# Patient Record
Sex: FEMALE | URBAN - METROPOLITAN AREA
[De-identification: names, ages, dates, MRNs, and addresses within clinical notes are randomized per-mention and may not be internally consistent; named-entity substitution may affect disease eponyms.]

---

## 2024-03-29 ENCOUNTER — OV NP (OUTPATIENT)
Dept: URBAN - METROPOLITAN AREA CLINIC 72 | Facility: CLINIC | Age: 34
End: 2024-03-29
Payer: COMMERCIAL

## 2024-03-29 ENCOUNTER — LAB (OUTPATIENT)
Dept: URBAN - METROPOLITAN AREA CLINIC 72 | Facility: CLINIC | Age: 34
End: 2024-03-29

## 2024-03-29 VITALS
BODY MASS INDEX: 35.74 KG/M2 | WEIGHT: 194.2 LBS | TEMPERATURE: 97.3 F | DIASTOLIC BLOOD PRESSURE: 88 MMHG | HEART RATE: 69 BPM | HEIGHT: 62 IN | SYSTOLIC BLOOD PRESSURE: 137 MMHG

## 2024-03-29 DIAGNOSIS — K21.00 GASTROESOPHAGEAL REFLUX DISEASE WITH ESOPHAGITIS WITHOUT HEMORRHAGE: ICD-10-CM

## 2024-03-29 DIAGNOSIS — R11.0 NAUSEA: ICD-10-CM

## 2024-03-29 DIAGNOSIS — R19.4 ALTERED BOWEL HABITS: ICD-10-CM

## 2024-03-29 DIAGNOSIS — R10.13 EPIGASTRIC PAIN: ICD-10-CM

## 2024-03-29 DIAGNOSIS — Z83.719 FAMILY HISTORY OF COLONIC POLYPS: ICD-10-CM

## 2024-03-29 DIAGNOSIS — R10.84 GENERALIZED ABDOMINAL PAIN: ICD-10-CM

## 2024-03-29 DIAGNOSIS — R74.8 ELEVATED LIVER ENZYMES: ICD-10-CM

## 2024-03-29 DIAGNOSIS — K76.0 NON-ALCOHOLIC FATTY LIVER DISEASE: ICD-10-CM

## 2024-03-29 PROBLEM — 266433003: Status: ACTIVE | Noted: 2024-03-29

## 2024-03-29 PROBLEM — 1231824009: Status: ACTIVE | Noted: 2024-03-29

## 2024-03-29 PROCEDURE — 99204 OFFICE O/P NEW MOD 45 MIN: CPT | Performed by: NURSE PRACTITIONER

## 2024-03-29 RX ORDER — SOD SULF/POT CHLORIDE/MAG SULF 1.479 G
AS DIRECTED TABLET ORAL
Qty: 1 KIT | Refills: 0 | OUTPATIENT
Start: 2024-03-29

## 2024-03-29 RX ORDER — NORETHINDRONE ACETATE AND ETHINYL ESTRADIOL, AND FERROUS FUMARATE 1MG-20(24)
1 TABLET KIT ORAL ONCE A DAY
Status: ACTIVE | COMMUNITY

## 2024-03-29 RX ORDER — ESOMEPRAZOLE MAGNESIUM 20 MG/1
1 CAPSULE CAPSULE, DELAYED RELEASE ORAL ONCE A DAY
Status: ACTIVE | COMMUNITY

## 2024-03-29 RX ORDER — LEVOTHYROXINE SODIUM 112 UG/1
1 CAPSULE IN THE MORNING ON AN EMPTY STOMACH CAPSULE ORAL ONCE A DAY
Status: ACTIVE | COMMUNITY

## 2024-03-29 RX ORDER — LOSARTAN POTASSIUM 25 MG/1
1 TABLET TABLET, FILM COATED ORAL ONCE A DAY
Status: ACTIVE | COMMUNITY

## 2024-03-29 RX ORDER — EMPAGLIFLOZIN 10 MG/1
1 TABLET TABLET, FILM COATED ORAL ONCE A DAY
Status: ACTIVE | COMMUNITY

## 2024-03-29 RX ORDER — ESCITALOPRAM OXALATE 10 MG/1
1 TABLET TABLET ORAL ONCE A DAY
Status: ACTIVE | COMMUNITY

## 2024-03-29 NOTE — PHYSICAL EXAM GASTROINTESTINAL
soft, nondistended, generalized tenderness upper abdomen greater than lower abdomen, no guarding or rigidity, no rebound tenderness

## 2024-03-29 NOTE — HPI-OTHER HISTORIES
Labs: -4/27/2023. LFTs normal. -11/28/2023. LFTs: ALT 97, AST 64. -1/19/2024. CMP: AST 88, ALT 96. TSH, hemoglobin A1c normal. -3/8/2024. GGT 66. CMP: Glucose 68, AST 49, ALT 59.  Imaging: -Gallbladder ultrasound 3/27/2024 revealed hepatic steatosis. Normal pancreas and gallbladder. CBD not visualized secondary to overlying bowel gas.

## 2024-03-29 NOTE — HPI-TODAY'S VISIT:
33-year-old female new to the clinic referred by Dr. Zuleta for elevated LFTs.   A copy of this note will be sent to the referring provider   She follows endocrinology Dr. Caldwell for Hashimoto's Hypothyroidism and insulin resistance.  Her older sister had colon polyps.    She takes omeprazole for GERD for the past 2 months, that has helped her symptoms.  Reflux is worse at night.  Has significant bloating for several months. Altered bowel habits. Had formed 2 BM's yesterday.  No melena or hematochezia. Has nausea, with dry heaves. Has ondansetron for nausea. No weight loss.  Rare ETOH use. Denies drugs.  No herbal supplements.  Had GI Map 2022, had C. Diff was treated with numerous supplements. No CP, SOB, palpitations, syncope, near-syncope or problems with anesthesia previously.   Was on several GL1-P agonists last year for insulin resistance but they didn't help. Has been off them for a year.    No prior EGD or Colonoscopy LMP: Denies pregnancy.

## 2024-04-26 ENCOUNTER — OV EP (OUTPATIENT)
Dept: URBAN - METROPOLITAN AREA CLINIC 72 | Facility: CLINIC | Age: 34
End: 2024-04-26
Payer: COMMERCIAL

## 2024-04-26 VITALS
HEART RATE: 80 BPM | HEIGHT: 62 IN | TEMPERATURE: 96.6 F | BODY MASS INDEX: 35.41 KG/M2 | SYSTOLIC BLOOD PRESSURE: 128 MMHG | DIASTOLIC BLOOD PRESSURE: 83 MMHG | WEIGHT: 192.4 LBS

## 2024-04-26 DIAGNOSIS — K21.00 GASTROESOPHAGEAL REFLUX DISEASE WITH ESOPHAGITIS WITHOUT HEMORRHAGE: ICD-10-CM

## 2024-04-26 DIAGNOSIS — R10.84 GENERALIZED ABDOMINAL PAIN: ICD-10-CM

## 2024-04-26 DIAGNOSIS — R10.13 EPIGASTRIC PAIN: ICD-10-CM

## 2024-04-26 DIAGNOSIS — R19.4 ALTERED BOWEL HABITS: ICD-10-CM

## 2024-04-26 DIAGNOSIS — K75.81 STEATOHEPATITIS: ICD-10-CM

## 2024-04-26 DIAGNOSIS — R11.0 NAUSEA: ICD-10-CM

## 2024-04-26 DIAGNOSIS — Z83.719 FAMILY HISTORY OF COLONIC POLYPS: ICD-10-CM

## 2024-04-26 PROBLEM — 442191002: Status: ACTIVE | Noted: 2024-04-26

## 2024-04-26 PROCEDURE — 99214 OFFICE O/P EST MOD 30 MIN: CPT | Performed by: NURSE PRACTITIONER

## 2024-04-26 RX ORDER — ESCITALOPRAM OXALATE 10 MG/1
1 TABLET TABLET ORAL ONCE A DAY
Status: ACTIVE | COMMUNITY

## 2024-04-26 RX ORDER — LOSARTAN POTASSIUM 25 MG/1
1 TABLET TABLET, FILM COATED ORAL ONCE A DAY
Status: ACTIVE | COMMUNITY

## 2024-04-26 RX ORDER — ESOMEPRAZOLE MAGNESIUM 20 MG/1
1 CAPSULE CAPSULE, DELAYED RELEASE ORAL ONCE A DAY
Status: ACTIVE | COMMUNITY

## 2024-04-26 RX ORDER — LEVOTHYROXINE SODIUM 112 UG/1
1 CAPSULE IN THE MORNING ON AN EMPTY STOMACH CAPSULE ORAL ONCE A DAY
Status: ACTIVE | COMMUNITY

## 2024-04-26 RX ORDER — SOD SULF/POT CHLORIDE/MAG SULF 1.479 G
AS DIRECTED TABLET ORAL
Qty: 1 KIT | Refills: 0 | OUTPATIENT

## 2024-04-26 RX ORDER — NORETHINDRONE ACETATE AND ETHINYL ESTRADIOL, AND FERROUS FUMARATE 1MG-20(24)
1 TABLET KIT ORAL ONCE A DAY
Status: ACTIVE | COMMUNITY

## 2024-04-26 RX ORDER — EMPAGLIFLOZIN 10 MG/1
1 TABLET TABLET, FILM COATED ORAL ONCE A DAY
Status: ACTIVE | COMMUNITY

## 2024-04-26 RX ORDER — SOD SULF/POT CHLORIDE/MAG SULF 1.479 G
AS DIRECTED TABLET ORAL
Qty: 1 KIT | Refills: 0 | Status: ACTIVE | COMMUNITY
Start: 2024-03-29

## 2024-04-26 NOTE — HPI-TODAY'S VISIT:
33-year-old female here for a 1 month follow-up appointment.  Last seen 3/29/2024 with elevated liver enzymes, epigastric pain, GERD, altered bowel habits, nausea and nonalcoholic fatty liver disease, family history of colon polyps. Labs ordered for further evaluation. For her GERD and family history of colon polyps EGD and colonoscopy ordered as well. Procedures are scheduled for 5/3/2024.  She follows endocrinology Dr. Caldwell for Hashimoto's Hypothyroidism and insulin resistance.  Her older sister had colon polyps.    Today symptoms similar to prior.  She takes omeprazole for GERD for the past 3 months, that has helped her symptoms.  Reflux is worse at night.  Has significant bloating for several months. Altered bowel habits. Had formed 2 BM's yesterday.  No melena or hematochezia. Has nausea, with dry heaves. Has ondansetron for nausea. No weight loss.  Rare ETOH use. Denies drugs.  No herbal supplements.  Had GI Map 2022, had C. Diff was treated with numerous supplements. Weight stable from last appointment.    Was on several GL1-P agonists last year for insulin resistance but they didn't help. Has been off them for a year.    No prior EGD or Colonoscopy LMP: Denies pregnancy.

## 2024-04-26 NOTE — HPI-OTHER HISTORIES
Labs: -4/27/2023. LFTs normal. -11/28/2023. LFTs: ALT 97, AST 64. -1/19/2024. CMP: AST 88, ALT 96. TSH, hemoglobin A1c normal. -3/8/2024. GGT 66. CMP: Glucose 68, AST 49, ALT 59. -4/12/2024. Iron studies normal. Ceruloplasmin mildly elevated 46.7. GGT 86. A1A normal. Ferritin, ASMA, AMA, LKMB, MARKOS, hemochromatosis and hepatitis panel all negative. She is not immune to hepatitis B.  Imaging: -Gallbladder ultrasound 3/27/2024 revealed hepatic steatosis. Normal pancreas and gallbladder. CBD not visualized secondary to overlying bowel gas.

## 2024-05-03 ENCOUNTER — OFFICE VISIT (OUTPATIENT)
Dept: URBAN - METROPOLITAN AREA MEDICAL CENTER 40 | Facility: MEDICAL CENTER | Age: 34
End: 2024-05-03
Payer: COMMERCIAL

## 2024-05-03 DIAGNOSIS — K20.80 ESOPHAGITIS, LOS ANGELES GRADE A: ICD-10-CM

## 2024-05-03 DIAGNOSIS — Z83.719 FAMILY HISTORY OF COLON POLYPS, UNSPECIFIED: ICD-10-CM

## 2024-05-03 DIAGNOSIS — K29.80 ACUTE DUODENITIS: ICD-10-CM

## 2024-05-03 DIAGNOSIS — D12.3 ADENOMA OF TRANSVERSE COLON: ICD-10-CM

## 2024-05-03 DIAGNOSIS — R19.4 ALTERATION IN BOWEL ELIMINATION: ICD-10-CM

## 2024-05-03 PROCEDURE — 43239 EGD BIOPSY SINGLE/MULTIPLE: CPT | Performed by: INTERNAL MEDICINE

## 2024-05-03 PROCEDURE — 45385 COLONOSCOPY W/LESION REMOVAL: CPT | Performed by: INTERNAL MEDICINE

## 2024-05-03 RX ORDER — ESCITALOPRAM OXALATE 10 MG/1
1 TABLET TABLET ORAL ONCE A DAY
Status: ACTIVE | COMMUNITY

## 2024-05-03 RX ORDER — ESOMEPRAZOLE MAGNESIUM 20 MG/1
1 CAPSULE CAPSULE, DELAYED RELEASE ORAL ONCE A DAY
Status: ACTIVE | COMMUNITY

## 2024-05-03 RX ORDER — EMPAGLIFLOZIN 10 MG/1
1 TABLET TABLET, FILM COATED ORAL ONCE A DAY
Status: ACTIVE | COMMUNITY

## 2024-05-03 RX ORDER — SOD SULF/POT CHLORIDE/MAG SULF 1.479 G
AS DIRECTED TABLET ORAL
Qty: 1 KIT | Refills: 0 | Status: ACTIVE | COMMUNITY

## 2024-05-03 RX ORDER — NORETHINDRONE ACETATE AND ETHINYL ESTRADIOL, AND FERROUS FUMARATE 1MG-20(24)
1 TABLET KIT ORAL ONCE A DAY
Status: ACTIVE | COMMUNITY

## 2024-05-03 RX ORDER — LEVOTHYROXINE SODIUM 112 UG/1
1 CAPSULE IN THE MORNING ON AN EMPTY STOMACH CAPSULE ORAL ONCE A DAY
Status: ACTIVE | COMMUNITY

## 2024-05-03 RX ORDER — LOSARTAN POTASSIUM 25 MG/1
1 TABLET TABLET, FILM COATED ORAL ONCE A DAY
Status: ACTIVE | COMMUNITY

## 2024-05-17 ENCOUNTER — OFFICE VISIT (OUTPATIENT)
Dept: URBAN - METROPOLITAN AREA CLINIC 72 | Facility: CLINIC | Age: 34
End: 2024-05-17
Payer: COMMERCIAL

## 2024-05-17 ENCOUNTER — CLAIMS CREATED FROM THE CLAIM WINDOW (OUTPATIENT)
Dept: URBAN - METROPOLITAN AREA CLINIC 72 | Facility: CLINIC | Age: 34
End: 2024-05-17

## 2024-05-17 ENCOUNTER — DASHBOARD ENCOUNTERS (OUTPATIENT)
Age: 34
End: 2024-05-17

## 2024-05-17 VITALS
DIASTOLIC BLOOD PRESSURE: 93 MMHG | HEIGHT: 62 IN | SYSTOLIC BLOOD PRESSURE: 138 MMHG | HEART RATE: 66 BPM | WEIGHT: 191.2 LBS | TEMPERATURE: 97.9 F | BODY MASS INDEX: 35.19 KG/M2

## 2024-05-17 DIAGNOSIS — R14.0 ABDOMINAL BLOATING: ICD-10-CM

## 2024-05-17 DIAGNOSIS — K75.81 STEATOHEPATITIS: ICD-10-CM

## 2024-05-17 DIAGNOSIS — Z86.010 HISTORY OF ADENOMATOUS POLYP OF COLON: ICD-10-CM

## 2024-05-17 DIAGNOSIS — R11.0 NAUSEA: ICD-10-CM

## 2024-05-17 DIAGNOSIS — R19.4 ALTERED BOWEL HABITS: ICD-10-CM

## 2024-05-17 DIAGNOSIS — Z83.719 FAMILY HISTORY OF COLONIC POLYPS: ICD-10-CM

## 2024-05-17 DIAGNOSIS — R10.84 GENERALIZED ABDOMINAL PAIN: ICD-10-CM

## 2024-05-17 DIAGNOSIS — K21.00 GASTROESOPHAGEAL REFLUX DISEASE WITH ESOPHAGITIS WITHOUT HEMORRHAGE: ICD-10-CM

## 2024-05-17 PROBLEM — 429047008: Status: ACTIVE | Noted: 2024-05-17

## 2024-05-17 PROCEDURE — 99214 OFFICE O/P EST MOD 30 MIN: CPT | Performed by: NURSE PRACTITIONER

## 2024-05-17 RX ORDER — ESCITALOPRAM OXALATE 10 MG/1
1 TABLET TABLET ORAL ONCE A DAY
Status: ACTIVE | COMMUNITY

## 2024-05-17 RX ORDER — ESOMEPRAZOLE MAGNESIUM 20 MG/1
1 CAPSULE CAPSULE, DELAYED RELEASE ORAL ONCE A DAY
Status: ACTIVE | COMMUNITY

## 2024-05-17 RX ORDER — NORETHINDRONE ACETATE AND ETHINYL ESTRADIOL, AND FERROUS FUMARATE 1MG-20(24)
1 TABLET KIT ORAL ONCE A DAY
Status: ACTIVE | COMMUNITY

## 2024-05-17 RX ORDER — SOD SULF/POT CHLORIDE/MAG SULF 1.479 G
AS DIRECTED TABLET ORAL
Qty: 1 KIT | Refills: 0 | Status: ACTIVE | COMMUNITY

## 2024-05-17 RX ORDER — LEVOTHYROXINE SODIUM 112 UG/1
1 CAPSULE IN THE MORNING ON AN EMPTY STOMACH CAPSULE ORAL ONCE A DAY
Status: ACTIVE | COMMUNITY

## 2024-05-17 RX ORDER — LOSARTAN POTASSIUM 25 MG/1
1 TABLET TABLET, FILM COATED ORAL ONCE A DAY
Status: ACTIVE | COMMUNITY

## 2024-05-17 RX ORDER — EMPAGLIFLOZIN 10 MG/1
1 TABLET TABLET, FILM COATED ORAL ONCE A DAY
Status: ACTIVE | COMMUNITY

## 2024-05-17 RX ORDER — ESOMEPRAZOLE MAGNESIUM 40 MG/1
1 CAPSULE 30 MINUTES BEFORE BREAKFAST ON AN EMPTY STOMACH CAPSULE, DELAYED RELEASE ORAL ONCE A DAY
Qty: 30 | Refills: 1

## 2024-08-07 ENCOUNTER — ERX REFILL RESPONSE (OUTPATIENT)
Dept: URBAN - METROPOLITAN AREA CLINIC 72 | Facility: CLINIC | Age: 34
End: 2024-08-07

## 2024-08-07 RX ORDER — ESOMEPRAZOLE MAGNESIUM 40 MG/1
TAKE ONE CAPSULE BY MOUTH ONE TIME DAILY 30 MINUTES BEFORE BREAKFAST ON AN EMPTY STOMACH CAPSULE, DELAYED RELEASE ORAL
Qty: 30 CAPSULE | Refills: 1 | OUTPATIENT

## 2024-08-07 RX ORDER — ESOMEPRAZOLE MAGNESIUM 40 MG/1
1 CAPSULE 30 MINUTES BEFORE BREAKFAST ON AN EMPTY STOMACH CAPSULE, DELAYED RELEASE ORAL ONCE A DAY
Qty: 30 | Refills: 1 | OUTPATIENT

## 2024-09-30 ENCOUNTER — LAB OUTSIDE AN ENCOUNTER (OUTPATIENT)
Dept: URBAN - METROPOLITAN AREA CLINIC 72 | Facility: CLINIC | Age: 34
End: 2024-09-30

## 2024-10-03 PROBLEM — 428283002: Status: ACTIVE | Noted: 2024-10-03

## 2024-10-04 ENCOUNTER — OFFICE VISIT (OUTPATIENT)
Dept: URBAN - METROPOLITAN AREA CLINIC 72 | Facility: CLINIC | Age: 34
End: 2024-10-04
Payer: COMMERCIAL

## 2024-10-04 VITALS
TEMPERATURE: 97.5 F | DIASTOLIC BLOOD PRESSURE: 91 MMHG | HEIGHT: 62 IN | HEART RATE: 77 BPM | BODY MASS INDEX: 35.59 KG/M2 | SYSTOLIC BLOOD PRESSURE: 136 MMHG | WEIGHT: 193.4 LBS

## 2024-10-04 DIAGNOSIS — K76.0 NON-ALCOHOLIC FATTY LIVER DISEASE: ICD-10-CM

## 2024-10-04 DIAGNOSIS — R11.0 NAUSEA: ICD-10-CM

## 2024-10-04 DIAGNOSIS — Z86.0101 PERSONAL HISTORY OF ADENOMATOUS AND SERRATED COLON POLYPS: ICD-10-CM

## 2024-10-04 DIAGNOSIS — K21.00 GASTROESOPHAGEAL REFLUX DISEASE WITH ESOPHAGITIS WITHOUT HEMORRHAGE: ICD-10-CM

## 2024-10-04 DIAGNOSIS — K59.09 CHRONIC CONSTIPATION: ICD-10-CM

## 2024-10-04 PROBLEM — 422587007: Status: ACTIVE | Noted: 2024-10-04

## 2024-10-04 PROBLEM — 236069009: Status: ACTIVE | Noted: 2024-10-04

## 2024-10-04 PROCEDURE — 99214 OFFICE O/P EST MOD 30 MIN: CPT | Performed by: INTERNAL MEDICINE

## 2024-10-04 RX ORDER — LOSARTAN POTASSIUM 100 MG/1
1 TABLET ONCE A DAY TABLET, FILM COATED ORAL ONCE A DAY
Status: ACTIVE | COMMUNITY

## 2024-10-04 RX ORDER — ESCITALOPRAM OXALATE 10 MG/1
1 TABLET TABLET ORAL ONCE A DAY
Status: ACTIVE | COMMUNITY

## 2024-10-04 RX ORDER — NORETHINDRONE ACETATE AND ETHINYL ESTRADIOL, AND FERROUS FUMARATE 1MG-20(24)
1 TABLET KIT ORAL ONCE A DAY
Status: ACTIVE | COMMUNITY

## 2024-10-04 RX ORDER — FAMOTIDINE 40 MG/1
1 TABLET AT BEDTIME TABLET, FILM COATED ORAL ONCE A DAY
Qty: 90 TABLET | Refills: 3 | OUTPATIENT
Start: 2024-10-04

## 2024-10-04 RX ORDER — EMPAGLIFLOZIN 10 MG/1
1 TABLET TABLET, FILM COATED ORAL ONCE A DAY
Status: ON HOLD | COMMUNITY

## 2024-10-04 RX ORDER — OMEPRAZOLE 20 MG/1
1 CAPSULE 1/2 TO 1 HOUR BEFORE MORNING MEAL CAPSULE, DELAYED RELEASE ORAL ONCE A DAY
Qty: 90 | Refills: 3 | OUTPATIENT
Start: 2024-10-04

## 2024-10-04 RX ORDER — SOD SULF/POT CHLORIDE/MAG SULF 1.479 G
AS DIRECTED TABLET ORAL
Qty: 1 KIT | Refills: 0 | Status: ON HOLD | COMMUNITY

## 2024-10-04 RX ORDER — LEVOTHYROXINE SODIUM 112 UG/1
1 CAPSULE IN THE MORNING ON AN EMPTY STOMACH CAPSULE ORAL ONCE A DAY
Status: ACTIVE | COMMUNITY

## 2024-10-04 RX ORDER — ESOMEPRAZOLE MAGNESIUM 40 MG/1
TAKE ONE CAPSULE BY MOUTH ONE TIME DAILY 30 MINUTES BEFORE BREAKFAST ON AN EMPTY STOMACH CAPSULE, DELAYED RELEASE ORAL
Qty: 30 CAPSULE | Refills: 1 | Status: ACTIVE | COMMUNITY

## 2024-10-04 NOTE — HPI-TODAY'S VISIT:
Patient is a 34-year-old female last seen in the office on 5/17/2024 by Nena Bedolla NP for GERD, bloating, abdominal pain, altered bowel habits, nausea, steatohepatitis, history of colon polyps, and a family history of colon polyps.  EGD and colonoscopy done on 5/3/2024 revealed esophagogastritis and duodenitis.  Patient is on esomeprazole 40 mg daily for this.  Patient is here for 5-month follow-up.  Abdominal ultrasound done in early September 2024 shows moderate risk for clinically significant fibrosis, increased hepatic echogenicity, and hepatomegaly.  Patient still needs LFTs.  Diet and exercise need to be discussed and emphasized with the patient.  Patient has decreased her dose of esomeprazole to 20 mg daily and her sx are well controlled over the past couple of weeks since she decreased.   Patient was started in tirazepitide and is now having constipation issues.

## 2024-10-04 NOTE — HPI-OTHER HISTORIES
Labs: -4/27/2023. LFTs normal. -11/28/2023. LFTs: ALT 97, AST 64. -1/19/2024. CMP: AST 88, ALT 96. TSH, hemoglobin A1c normal. -3/8/2024. GGT 66. CMP: Glucose 68, AST 49, ALT 59. -4/12/2024. Iron studies normal. Ceruloplasmin mildly elevated 46.7. GGT 86. A1A normal. Ferritin, ASMA, AMA, LKMB, MARKOS, hemochromatosis and hepatitis panel all negative. She is not immune to hepatitis B.  Imaging: -Gallbladder ultrasound 3/27/2024 revealed hepatic steatosis. Normal pancreas and gallbladder. CBD not visualized secondary to overlying bowel gas.   -Abdominal ultrasound-9/6/2024: Ultrasound shear wave elastography indicates a moderate risk for clinically significant fibrosis.  Increased hepatic echogenicity and hepatomegaly which can be seen with hepatocellular disease, including hepatic steatosis.  Procedures: -EGD/colonoscopy 5/3/2024. EGD was normal. Colonoscopy revealed a polyp in the transverse colon which was removed. Esophageal biopsy revealed moderate to severe chronic esophagogastritis. Gastric biopsy normal no H. pylori. Duodenal biopsy revealed peptic duodenitis. Polyp was sessile serrated adenoma no dysplasia. Recommend she continue acid suppression medicine and due for surveillance colonoscopy in 5 years. Recall added. Due 5/2029.